# Patient Record
Sex: FEMALE | Race: WHITE | NOT HISPANIC OR LATINO | URBAN - METROPOLITAN AREA
[De-identification: names, ages, dates, MRNs, and addresses within clinical notes are randomized per-mention and may not be internally consistent; named-entity substitution may affect disease eponyms.]

---

## 2017-11-27 ENCOUNTER — OFFICE VISIT (OUTPATIENT)
Dept: URGENT CARE | Facility: CLINIC | Age: 62
End: 2017-11-27
Payer: COMMERCIAL

## 2017-11-27 VITALS
BODY MASS INDEX: 27.88 KG/M2 | HEART RATE: 78 BPM | SYSTOLIC BLOOD PRESSURE: 120 MMHG | TEMPERATURE: 98 F | WEIGHT: 142 LBS | OXYGEN SATURATION: 93 % | RESPIRATION RATE: 20 BRPM | HEIGHT: 60 IN | DIASTOLIC BLOOD PRESSURE: 80 MMHG

## 2017-11-27 DIAGNOSIS — J20.9 ACUTE BRONCHITIS, UNSPECIFIED ORGANISM: ICD-10-CM

## 2017-11-27 DIAGNOSIS — F17.200 TOBACCO DEPENDENCE: ICD-10-CM

## 2017-11-27 PROCEDURE — 99406 BEHAV CHNG SMOKING 3-10 MIN: CPT | Performed by: FAMILY MEDICINE

## 2017-11-27 PROCEDURE — 99204 OFFICE O/P NEW MOD 45 MIN: CPT | Performed by: FAMILY MEDICINE

## 2017-11-27 RX ORDER — ALBUTEROL SULFATE 90 UG/1
1-2 AEROSOL, METERED RESPIRATORY (INHALATION) EVERY 6 HOURS PRN
Qty: 8.5 G | Refills: 0 | Status: SHIPPED | OUTPATIENT
Start: 2017-11-27 | End: 2022-08-13

## 2017-11-27 RX ORDER — DOXYCYCLINE HYCLATE 100 MG
100 TABLET ORAL 2 TIMES DAILY
Qty: 20 TAB | Refills: 0 | Status: SHIPPED | OUTPATIENT
Start: 2017-11-27 | End: 2017-12-07

## 2017-11-27 RX ORDER — PREDNISONE 10 MG/1
TABLET ORAL
Qty: 9 TAB | Refills: 0 | Status: SHIPPED | OUTPATIENT
Start: 2017-11-27 | End: 2022-08-13

## 2017-11-28 NOTE — PROGRESS NOTES
Subjective:      Mario Wood is a 62 y.o. female who presents with Cough (Almost a week wet cough and chest congestion )    Patient presents to urgent care with 5 days of cough and chest congestion with wheezing patient states the last time she had something similar was about 4-5 years ago she was treated with a breathing treatment antibiotics and inhalers and felt better within 4872 hours. No nausea vomiting or diarrhea she denies feeling lightheaded or faint. She's had some mild nasal congestion as well.denies fevers and chills.     HPI  ROS    PMH:  has no past medical history on file.  MEDS:   Current Outpatient Prescriptions:   •  NON SPECIFIED, anitinflammatory and muscle relaxer ink name , Disp: , Rfl:   •  oxycodone-acetaminophen (PERCOCET) 5-325 MG TABS, Take 1-2 Tabs by mouth every four hours as needed for Mild Pain., Disp: 20 Each, Rfl: 0  ALLERGIES: No Known Allergies  SURGHX:   Past Surgical History:   Procedure Laterality Date   • HYSTERECTOMY LAPAROSCOPY     • OTHER ORTHOPEDIC SURGERY  hand surg     SOCHX:  reports that she has been smoking.  She has been smoking about 0.50 packs per day. She has never used smokeless tobacco.  FH: Family history was reviewed, no pertinent findings to report     Objective:     /80   Pulse 78   Temp 36.7 °C (98 °F)   Resp 20   Ht 1.524 m (5')   Wt 64.4 kg (142 lb)   SpO2 93%   BMI 27.73 kg/m²      Physical Exam   Constitutional: She is oriented to person, place, and time. She appears well-developed and well-nourished. No distress.   HENT:   Mouth/Throat: Oropharynx is clear and moist.   Eyes: Conjunctivae are normal.   Cardiovascular: Normal rate, regular rhythm, normal heart sounds and intact distal pulses.  Exam reveals no gallop and no friction rub.    No murmur heard.  Pulmonary/Chest: Effort normal. She has wheezes. She exhibits no tenderness.   Audible wheezes noted on exam. Patient does not appear in distress.   Musculoskeletal: Normal range of  motion.   Neurological: She is alert and oriented to person, place, and time.   Skin: Skin is warm and dry. She is not diaphoretic.   Psychiatric: She has a normal mood and affect. Her behavior is normal. Judgment and thought content normal.     Therapeutics: nppb duoneb after which patient had improved air excursion her wheezes resolved she felt better     Assessment/Plan:     1. Acute bronchitis, unspecified organism  predniSONE (DELTASONE) 10 MG Tab    doxycycline (VIBRAMYCIN) 100 MG Tab    albuterol 108 (90 Base) MCG/ACT Aero Soln inhalation aerosol    fluticasone-salmeterol (ADVAIR) 250-50 MCG/DOSE AEROSOL POWDER, BREATH ACTIVATED   2. Tobacco dependence       Tobacco cessation counseling was provided to the patient for a duration of three to seven minutes.  Tobacco effects, benefits of cessation and methods to use to achieve this goal were briefly discussed. An information handout was offered.      Patient will establish care at the [facility] location she is anxious to start on Chantix for tobacco cessation

## 2018-10-05 ENCOUNTER — HOSPITAL ENCOUNTER (OUTPATIENT)
Dept: HOSPITAL 8 - CFH | Age: 63
Discharge: HOME | End: 2018-10-05
Attending: OBSTETRICS & GYNECOLOGY
Payer: COMMERCIAL

## 2018-10-05 DIAGNOSIS — Z12.31: Primary | ICD-10-CM

## 2019-11-20 ENCOUNTER — HOSPITAL ENCOUNTER (OUTPATIENT)
Dept: HOSPITAL 8 - CFH | Age: 64
Discharge: HOME | End: 2019-11-20
Attending: OBSTETRICS & GYNECOLOGY
Payer: COMMERCIAL

## 2019-11-20 DIAGNOSIS — N64.89: ICD-10-CM

## 2019-11-20 DIAGNOSIS — Z12.31: Primary | ICD-10-CM

## 2019-12-11 ENCOUNTER — OFFICE VISIT (OUTPATIENT)
Dept: URGENT CARE | Facility: CLINIC | Age: 64
End: 2019-12-11
Payer: COMMERCIAL

## 2019-12-11 VITALS
DIASTOLIC BLOOD PRESSURE: 80 MMHG | RESPIRATION RATE: 20 BRPM | HEART RATE: 68 BPM | TEMPERATURE: 98.9 F | SYSTOLIC BLOOD PRESSURE: 122 MMHG | BODY MASS INDEX: 27.73 KG/M2 | OXYGEN SATURATION: 95 % | WEIGHT: 142 LBS

## 2019-12-11 DIAGNOSIS — J45.20 MILD INTERMITTENT ASTHMA WITHOUT COMPLICATION: ICD-10-CM

## 2019-12-11 DIAGNOSIS — H61.21 IMPACTED CERUMEN OF RIGHT EAR: ICD-10-CM

## 2019-12-11 PROCEDURE — 99214 OFFICE O/P EST MOD 30 MIN: CPT | Performed by: PHYSICIAN ASSISTANT

## 2019-12-11 RX ORDER — ALBUTEROL SULFATE 90 UG/1
2 AEROSOL, METERED RESPIRATORY (INHALATION) EVERY 6 HOURS PRN
Qty: 8.5 G | Refills: 1 | Status: SHIPPED | OUTPATIENT
Start: 2019-12-11

## 2019-12-11 ASSESSMENT — ENCOUNTER SYMPTOMS
DIZZINESS: 0
FEVER: 0
MUSCULOSKELETAL NEGATIVE: 1
COUGH: 1
ABDOMINAL PAIN: 0
SHORTNESS OF BREATH: 0
WHEEZING: 1
DIARRHEA: 0
NAUSEA: 0
HEMOPTYSIS: 0
SPUTUM PRODUCTION: 0
CHILLS: 0
SORE THROAT: 0
VOMITING: 0

## 2019-12-11 NOTE — LETTER
December 11, 2019         Patient: Mario Wood   YOB: 1955   Date of Visit: 12/11/2019           To Whom it May Concern:    Mario Wood was seen in my clinic on 12/11/2019. Please excuse her absence today, 12/11/19.    If you have any questions or concerns, please don't hesitate to call.        Sincerely,           Elisha Holt P.A.-C.  Electronically Signed

## 2019-12-11 NOTE — PROGRESS NOTES
Subjective:      Mario Wood is a 64 y.o. female who presents with Otalgia            Ear Fullness   This is a new problem. The current episode started yesterday. The problem occurs constantly. The problem has been unchanged. Associated symptoms include coughing. Pertinent negatives include no abdominal pain, chest pain, chills, congestion, fever, nausea, rash, sore throat or vomiting. Nothing aggravates the symptoms. She has tried nothing for the symptoms.     Patient presents to urgent care reporting a 1 day history of right sided hearing loss with slight ear pain. She reports she had URI symptoms 2-3 weeks ago that are gradually improving. PMH is significant for asthma, she is requesting a refill on Advair and her albuterol inhaler at today's visit. She is in the process of getting established with a new PCP.     Review of Systems   Constitutional: Negative for chills and fever.   HENT: Positive for ear pain and hearing loss. Negative for congestion, ear discharge and sore throat.    Respiratory: Positive for cough and wheezing. Negative for hemoptysis, sputum production and shortness of breath.    Cardiovascular: Negative for chest pain.   Gastrointestinal: Negative for abdominal pain, diarrhea, nausea and vomiting.   Genitourinary: Negative.    Musculoskeletal: Negative.    Skin: Negative for rash.   Neurological: Negative for dizziness.          Objective:     /80   Pulse 68   Temp 37.2 °C (98.9 °F) (Temporal)   Resp 20   Wt 64.4 kg (142 lb)   SpO2 95%   BMI 27.73 kg/m²      Physical Exam  Vitals signs and nursing note reviewed.   Constitutional:       General: She is not in acute distress.     Appearance: She is well-developed. She is not diaphoretic.   HENT:      Head: Normocephalic and atraumatic.      Right Ear: Hearing, tympanic membrane, ear canal and external ear normal. There is impacted cerumen.      Left Ear: Hearing, tympanic membrane, ear canal and external ear normal. There is no  impacted cerumen.      Ears:      Comments: Right TM visualized after successful ear lavage and appears normal      Nose: Nose normal. No congestion or rhinorrhea.      Mouth/Throat:      Mouth: Mucous membranes are moist.      Pharynx: No oropharyngeal exudate or posterior oropharyngeal erythema.   Eyes:      Conjunctiva/sclera: Conjunctivae normal.      Pupils: Pupils are equal, round, and reactive to light.   Neck:      Musculoskeletal: Normal range of motion.   Cardiovascular:      Rate and Rhythm: Normal rate and regular rhythm.      Heart sounds: Normal heart sounds. No murmur.   Pulmonary:      Effort: Pulmonary effort is normal.      Breath sounds: No wheezing or rales.   Musculoskeletal: Normal range of motion.   Skin:     General: Skin is warm and dry.   Neurological:      Mental Status: She is alert and oriented to person, place, and time.   Psychiatric:         Behavior: Behavior normal.            PMH:  has no past medical history on file.  MEDS:   Current Outpatient Medications:   •  fluticasone-salmeterol (ADVAIR) 250-50 MCG/DOSE AEROSOL POWDER, BREATH ACTIVATED, Inhale 1 Puff by mouth every day., Disp: 1 Inhaler, Rfl: 0  •  albuterol 108 (90 Base) MCG/ACT Aero Soln inhalation aerosol, Inhale 2 Puffs by mouth every 6 hours as needed for Shortness of Breath., Disp: 8.5 g, Rfl: 1  •  predniSONE (DELTASONE) 10 MG Tab, Three tabs daily for three days with food (Patient not taking: Reported on 12/11/2019), Disp: 9 Tab, Rfl: 0  •  albuterol 108 (90 Base) MCG/ACT Aero Soln inhalation aerosol, Inhale 1-2 Puffs by mouth every 6 hours as needed. (Patient not taking: Reported on 12/11/2019), Disp: 8.5 g, Rfl: 0  •  NON SPECIFIED, anitinflammatory and muscle relaxer ink name , Disp: , Rfl:   •  oxycodone-acetaminophen (PERCOCET) 5-325 MG TABS, Take 1-2 Tabs by mouth every four hours as needed for Mild Pain. (Patient not taking: Reported on 12/11/2019), Disp: 20 Each, Rfl: 0  ALLERGIES: No Known Allergies  SURGHX:    Past Surgical History:   Procedure Laterality Date   • HYSTERECTOMY LAPAROSCOPY     • OTHER ORTHOPEDIC SURGERY  hand surg     SOCHX:  reports that she has been smoking. She has been smoking about 0.50 packs per day. She has never used smokeless tobacco.  FH: family history is not on file.       Assessment/Plan:       1. Impacted cerumen of right ear    Ear lavage performed with significant amount of cerumen removed from right ear canals. TM visualized afterwards and appears normal. Patient reports resolution of hearing loss after cerumen removal. Advised patient to soak equal parts water and hydrogen peroxide for 5-10 minutes every 1-2 weeks for prevention of wax buildup in the future.     2. Mild intermittent asthma without complication  - fluticasone-salmeterol (ADVAIR) 250-50 MCG/DOSE AEROSOL POWDER, BREATH ACTIVATED; Inhale 1 Puff by mouth every day.  Dispense: 1 Inhaler; Refill: 0  - albuterol 108 (90 Base) MCG/ACT Aero Soln inhalation aerosol; Inhale 2 Puffs by mouth every 6 hours as needed for Shortness of Breath.  Dispense: 8.5 g; Refill: 1

## 2021-02-26 ENCOUNTER — HOSPITAL ENCOUNTER (OUTPATIENT)
Dept: HOSPITAL 8 - CFH | Age: 66
Discharge: HOME | End: 2021-02-26
Attending: OBSTETRICS & GYNECOLOGY
Payer: COMMERCIAL

## 2021-02-26 DIAGNOSIS — M81.8: ICD-10-CM

## 2021-02-26 DIAGNOSIS — Z12.31: Primary | ICD-10-CM

## 2021-02-26 PROCEDURE — 77080 DXA BONE DENSITY AXIAL: CPT

## 2021-02-26 PROCEDURE — 77067 SCR MAMMO BI INCL CAD: CPT

## 2021-03-10 ENCOUNTER — TELEPHONE (OUTPATIENT)
Dept: SCHEDULING | Facility: IMAGING CENTER | Age: 66
End: 2021-03-10

## 2022-08-13 ENCOUNTER — OFFICE VISIT (OUTPATIENT)
Dept: URGENT CARE | Facility: CLINIC | Age: 67
End: 2022-08-13
Payer: MEDICARE

## 2022-08-13 VITALS
WEIGHT: 127 LBS | RESPIRATION RATE: 14 BRPM | OXYGEN SATURATION: 95 % | HEART RATE: 76 BPM | DIASTOLIC BLOOD PRESSURE: 72 MMHG | BODY MASS INDEX: 24.94 KG/M2 | TEMPERATURE: 97.4 F | HEIGHT: 60 IN | SYSTOLIC BLOOD PRESSURE: 112 MMHG

## 2022-08-13 DIAGNOSIS — S39.012A STRAIN OF LUMBAR REGION, INITIAL ENCOUNTER: ICD-10-CM

## 2022-08-13 PROCEDURE — 99213 OFFICE O/P EST LOW 20 MIN: CPT | Performed by: FAMILY MEDICINE

## 2022-08-13 RX ORDER — CYCLOBENZAPRINE HCL 5 MG
2.5-1 TABLET ORAL 3 TIMES DAILY PRN
Qty: 30 TABLET | Refills: 0 | Status: SHIPPED | OUTPATIENT
Start: 2022-08-13 | End: 2022-12-25

## 2022-08-13 RX ORDER — TRAZODONE HYDROCHLORIDE 50 MG/1
50 TABLET ORAL DAILY
COMMUNITY
Start: 2022-08-03

## 2022-08-13 RX ORDER — ROSUVASTATIN CALCIUM 5 MG/1
5 TABLET, COATED ORAL DAILY
COMMUNITY
Start: 2022-07-06

## 2022-08-13 RX ORDER — LISINOPRIL 5 MG/1
5 TABLET ORAL DAILY
COMMUNITY
Start: 2022-05-10

## 2022-08-13 ASSESSMENT — ENCOUNTER SYMPTOMS: FEVER: 0

## 2022-08-13 NOTE — PROGRESS NOTES
Subjective:     Mario Wood is a 66 y.o. female who presents for Back Pain (Today, twisted lower back when getting off a rolling chair,  radiating pain, spasms.)    HPI  Pt presents for evaluation of an acute problem  Patient with acute low back pain which started when sitting in a chair, in the chair slipped out from under her  She felt that she twisted her back a little bit  Has chronic recurrent back pains and this feels similar  Usually the back pain resolves itself after a few hours, however this time it is not resolving  Trying to use heat and do light stretching  Has mild radiation from the left side of the low back into the left buttock/upper thigh  No numbness or tingling  Have pain with ambulation and otherwise no difficulties ambulating    Review of Systems   Constitutional:  Negative for fever.   Skin:  Negative for rash.     PMH:  has no past medical history on file.  MEDS:   Current Outpatient Medications:     lisinopril (PRINIVIL) 5 MG Tab, Take 5 mg by mouth every day., Disp: , Rfl:     rosuvastatin (CRESTOR) 5 MG Tab, Take 5 mg by mouth every day., Disp: , Rfl:     traZODone (DESYREL) 50 MG Tab, Take 50 mg by mouth every day., Disp: , Rfl:     Fluticasone-Umeclidin-Vilant (TRELEGY) 100-62.5-25 MCG/INH AEROSOL POWDER, BREATH ACTIVATED inhalation, Inhale 1 Puff every day., Disp: , Rfl:     fluticasone-salmeterol (ADVAIR) 250-50 MCG/DOSE AEROSOL POWDER, BREATH ACTIVATED, Inhale 1 Puff by mouth every day., Disp: 1 Inhaler, Rfl: 0    albuterol 108 (90 Base) MCG/ACT Aero Soln inhalation aerosol, Inhale 2 Puffs by mouth every 6 hours as needed for Shortness of Breath., Disp: 8.5 g, Rfl: 1    NON SPECIFIED, anitinflammatory and muscle relaxer ink name , Disp: , Rfl:   ALLERGIES: No Known Allergies  SURGHX:   Past Surgical History:   Procedure Laterality Date    HYSTERECTOMY LAPAROSCOPY      OTHER ORTHOPEDIC SURGERY  hand surg     SOCHX:  reports that she has been smoking cigarettes. She has been smoking  an average of .5 packs per day. She has never used smokeless tobacco. She reports current alcohol use. She reports that she does not currently use drugs.     Objective:   /72   Pulse 76   Temp 36.3 °C (97.4 °F) (Temporal)   Resp 14   Ht 1.524 m (5')   Wt 57.6 kg (127 lb)   SpO2 95%   BMI 24.80 kg/m²     Physical Exam  Constitutional:       General: She is not in acute distress.     Appearance: She is well-developed. She is not diaphoretic.   Pulmonary:      Effort: Pulmonary effort is normal.   Neurological:      Mental Status: She is alert.   Back:  General: No asymmetry, bruising, or erythema appreciated  ROM: flexion 30°, extension 10°, lateral bend 0°, lateral twist 30°  Palpation: No tenderness to palpation of spinous processes, no step-offs appreciated, n+TTP along left paraspinal muscles, no left buttock pain  Neuro: Sensation intact and equal bilaterally in LE's    Assessment/Plan:   Assessment    1. Strain of lumbar region, initial encounter  - cyclobenzaprine (FLEXERIL) 5 mg tablet; Take 0.5-2 Tablets by mouth 3 times a day as needed for Muscle Spasms.  Dispense: 30 Tablet; Refill: 0    Other orders  - lisinopril (PRINIVIL) 5 MG Tab; Take 5 mg by mouth every day.  - rosuvastatin (CRESTOR) 5 MG Tab; Take 5 mg by mouth every day.  - traZODone (DESYREL) 50 MG Tab; Take 50 mg by mouth every day.  - Fluticasone-Umeclidin-Vilant (TRELEGY) 100-62.5-25 MCG/INH AEROSOL POWDER, BREATH ACTIVATED inhalation; Inhale 1 Puff every day.    Patient with lumbar strain.  No radicular symptoms.  Treat with activity modification, heat, stretching, light exercise, and muscle relaxants as needed.  She has no red flag symptoms.  Reviewed fall precautions and will follow-up as needed.

## 2022-11-11 ENCOUNTER — PATIENT MESSAGE (OUTPATIENT)
Dept: HEALTH INFORMATION MANAGEMENT | Facility: OTHER | Age: 67
End: 2022-11-11

## 2022-12-25 ENCOUNTER — OFFICE VISIT (OUTPATIENT)
Dept: URGENT CARE | Facility: CLINIC | Age: 67
End: 2022-12-25
Payer: MEDICARE

## 2022-12-25 VITALS
TEMPERATURE: 97.4 F | BODY MASS INDEX: 22.78 KG/M2 | RESPIRATION RATE: 16 BRPM | SYSTOLIC BLOOD PRESSURE: 132 MMHG | HEIGHT: 60 IN | DIASTOLIC BLOOD PRESSURE: 62 MMHG | HEART RATE: 89 BPM | OXYGEN SATURATION: 94 % | WEIGHT: 116 LBS

## 2022-12-25 DIAGNOSIS — T14.8XXA WOUND OF SKIN: ICD-10-CM

## 2022-12-25 PROCEDURE — 12001 RPR S/N/AX/GEN/TRNK 2.5CM/<: CPT | Performed by: FAMILY MEDICINE

## 2022-12-26 NOTE — PROGRESS NOTES
Subjective     Mario Wood is a 67 y.o. female who presents with Laceration (Right ring finger, )      - This is a pleasant and nontoxic appearing 67 y.o. female who has come to the walk-in clinic today for:    #1) ~ 1 hr ago cut Rt ring finger on new Ninja knife whilst slicing turkey. Says last tetanus booster 1 yr ago about       ALLERGIES:  Patient has no known allergies.     PMH:  History reviewed. No pertinent past medical history.     PSH:  Past Surgical History:   Procedure Laterality Date    HYSTERECTOMY LAPAROSCOPY      OTHER ORTHOPEDIC SURGERY  hand surg       MEDS:    Current Outpatient Medications:     fluticasone-umeclidin-vilant (TRELEGY) 100-62.5-25 MCG/ACT AEROSOL POWDER, BREATH ACTIVATED inhalation, Inhale 1 Inhaler every day., Disp: , Rfl:     lisinopril (PRINIVIL) 5 MG Tab, Take 5 mg by mouth every day., Disp: , Rfl:     rosuvastatin (CRESTOR) 5 MG Tab, Take 5 mg by mouth every day., Disp: , Rfl:     traZODone (DESYREL) 50 MG Tab, Take 50 mg by mouth every day., Disp: , Rfl:     Fluticasone-Umeclidin-Vilant (TRELEGY) 100-62.5-25 MCG/INH AEROSOL POWDER, BREATH ACTIVATED inhalation, Inhale 1 Puff every day., Disp: , Rfl:     albuterol 108 (90 Base) MCG/ACT Aero Soln inhalation aerosol, Inhale 2 Puffs by mouth every 6 hours as needed for Shortness of Breath., Disp: 8.5 g, Rfl: 1    ** I have documented what I find to be significant in regards to past medical, social, family and surgical history  in my HPI or under PMH/PSH/FH review section, otherwise it is noncontributory **         HPI    Review of Systems   Skin:         Wound of skin     All other systems reviewed and are negative.           Objective     /62   Pulse 89   Temp 36.3 °C (97.4 °F) (Temporal)   Resp 16   Ht 1.524 m (5')   Wt 52.6 kg (116 lb)   SpO2 94%   BMI 22.65 kg/m²      Physical Exam  Vitals and nursing note reviewed.   Constitutional:       General: She is not in acute distress.     Appearance: Normal  appearance. She is well-developed.   HENT:      Head: Normocephalic.   Pulmonary:      Effort: Pulmonary effort is normal. No respiratory distress.   Musculoskeletal:        Hands:       Comments: Rt Ring Finger: ~1 cm lac. Good srom   Neurological:      Mental Status: She is alert.      Motor: No abnormal muscle tone.   Psychiatric:         Mood and Affect: Mood normal.         Behavior: Behavior normal.         Assessment & Plan       1. Wound of skin          Procedure: Laceration Repair       - Wound cleaned and/or irrigated w/ NS by MA.  - Clean technique used for procedure   - Local anesthesia with 2% lidocaine  - Closed with #2  5-0 Nylon interrupted sutures with good wound approximation. Care was taken not to disturb/injure underlying bones, joint, joint-space, tendons, nerves and major vessels   - Polysporin and dressing placed  - Patient tolerated well  - wound care/instructions discussed  - 2 day wound check advised

## 2023-01-01 ENCOUNTER — OFFICE VISIT (OUTPATIENT)
Dept: URGENT CARE | Facility: CLINIC | Age: 68
End: 2023-01-01
Payer: MEDICARE

## 2023-01-01 VITALS
WEIGHT: 125 LBS | HEIGHT: 60 IN | DIASTOLIC BLOOD PRESSURE: 64 MMHG | OXYGEN SATURATION: 95 % | TEMPERATURE: 97.7 F | RESPIRATION RATE: 16 BRPM | SYSTOLIC BLOOD PRESSURE: 128 MMHG | BODY MASS INDEX: 24.54 KG/M2 | HEART RATE: 74 BPM

## 2023-01-01 DIAGNOSIS — Z48.02 VISIT FOR SUTURE REMOVAL: ICD-10-CM

## 2023-01-01 DIAGNOSIS — T14.8XXA WOUND OF SKIN: ICD-10-CM

## 2023-01-01 PROBLEM — I10 PRIMARY HYPERTENSION: Status: ACTIVE | Noted: 2021-07-07

## 2023-01-01 PROBLEM — E78.5 HYPERLIPIDEMIA: Status: ACTIVE | Noted: 2021-07-07

## 2023-01-01 PROBLEM — J45.909 ASTHMA: Status: ACTIVE | Noted: 2021-08-30

## 2023-01-01 PROBLEM — G47.33 OBSTRUCTIVE SLEEP APNEA HYPOPNEA, MILD: Status: ACTIVE | Noted: 2022-04-06

## 2023-01-01 PROBLEM — J44.9 STAGE 4 VERY SEVERE COPD BY GOLD CLASSIFICATION (HCC): Status: ACTIVE | Noted: 2022-02-28

## 2023-01-01 PROCEDURE — 99212 OFFICE O/P EST SF 10 MIN: CPT | Performed by: PHYSICIAN ASSISTANT

## 2023-01-01 ASSESSMENT — ENCOUNTER SYMPTOMS
CARDIOVASCULAR NEGATIVE: 1
NEUROLOGICAL NEGATIVE: 1
CONSTITUTIONAL NEGATIVE: 1
RESPIRATORY NEGATIVE: 1

## 2023-01-01 NOTE — PROGRESS NOTES
Subjective:     Mario Wood  is a 67 y.o. female who presents for Suture / Staple Removal (Right ring finger, )       She presents today for suture removal 7 days status post laceration of right ring finger that occurred on 12/25/2022.  Injury occurred when the patient was washing a knife.  No bleeding or drainage from the wound since suture repair.  Some pain with direct pressure directly over the laceration.     Review of Systems   Constitutional: Negative.    Respiratory: Negative.     Cardiovascular: Negative.    Skin:         Healed laceration over the tip of the right ring finger with 2 sutures in place   Neurological: Negative.     No Known Allergies  History reviewed. No pertinent past medical history.     Objective:   /64   Pulse 74   Temp 36.5 °C (97.7 °F) (Temporal)   Resp 16   Ht 1.524 m (5')   Wt 56.7 kg (125 lb)   SpO2 95%   BMI 24.41 kg/m²   Physical Exam  Vitals and nursing note reviewed.   Constitutional:       General: She is not in acute distress.     Appearance: She is not ill-appearing or toxic-appearing.   HENT:      Head: Normocephalic.      Nose: No rhinorrhea.   Eyes:      General: No scleral icterus.     Conjunctiva/sclera: Conjunctivae normal.   Pulmonary:      Effort: Pulmonary effort is normal. No respiratory distress.      Breath sounds: No stridor.   Musculoskeletal:      Cervical back: Neck supple.   Skin:     Comments: Healed laceration present over the tip of the right ring finger with 2 sutures in place.  No active bleeding or drainage.  Wound edges are well-healed and scabbed.  No wound dehiscence noted.  Mild tenderness with direct palpation over the laceration   Neurological:      Mental Status: She is alert and oriented to person, place, and time.   Psychiatric:         Mood and Affect: Mood normal.         Behavior: Behavior normal.         Thought Content: Thought content normal.         Judgment: Judgment normal.           Diagnostic testing:  None    Assessment/Plan:     Encounter Diagnoses   Name Primary?    Wound of skin     Visit for suture removal           Plan for care for today's complaint includes removal of 2 superficial sutures present over the tip of the right ring finger.  No deep sutures were placed at time of suture repair.  Patient did tolerate procedure well.  No wound dehiscence at this time.  Wound care and wound cleaning procedures were discussed with the patient moving forward.  Activities as tolerated..    See AVS Instructions below for written guidance provided to patient on after-visit management and care in addition to our verbal discussion during the visit.    Please note that this dictation was created using voice recognition software. I have attempted to correct all errors, but there may be sound-alike, spelling, grammar and possibly content errors that I did not discover before finalizing the note.    Nimesh Keating PA-C

## 2023-01-02 ENCOUNTER — SUPERVISING PHYSICIAN REVIEW (OUTPATIENT)
Dept: URGENT CARE | Facility: CLINIC | Age: 68
End: 2023-01-02
Payer: COMMERCIAL

## 2023-01-02 NOTE — PROGRESS NOTES
Addendum 1/2/2023 .Encounter notes reviewed, I was not present to physically examine patient myself. No obvious and/or significant quality issues found solely from doing a chart review. RALPH Grant M.D.